# Patient Record
Sex: FEMALE | Race: WHITE | NOT HISPANIC OR LATINO
[De-identification: names, ages, dates, MRNs, and addresses within clinical notes are randomized per-mention and may not be internally consistent; named-entity substitution may affect disease eponyms.]

---

## 2022-10-07 PROBLEM — Z00.00 ENCOUNTER FOR PREVENTIVE HEALTH EXAMINATION: Status: ACTIVE | Noted: 2022-10-07

## 2022-10-26 ENCOUNTER — NON-APPOINTMENT (OUTPATIENT)
Age: 22
End: 2022-10-26

## 2022-11-22 ENCOUNTER — APPOINTMENT (OUTPATIENT)
Dept: NEUROSURGERY | Facility: CLINIC | Age: 22
End: 2022-11-22

## 2022-11-22 DIAGNOSIS — H93.A9 PULSATILE TINNITUS, UNSPECIFIED EAR: ICD-10-CM

## 2022-11-22 PROCEDURE — EDU01: CPT

## 2022-11-30 ENCOUNTER — TRANSCRIPTION ENCOUNTER (OUTPATIENT)
Age: 22
End: 2022-11-30

## 2022-12-05 NOTE — HISTORY OF PRESENT ILLNESS
[de-identified] : I conducted a remote educational consult with SANDRA KHALIL on 11/22/2022. I explained that I am only able to provide an educational consult and not render treatment as I am not licensed in the state in which SAN[place]HAL[place] is located. A written informed consent for the educational consult was obtained and is included in the EHR. SANDRA KHALIL is informed that there would be a $250 cost associated with the conduct of the remote educational consult. \par \par Ms. KHALIL is a pleasant 22 year old female who presents today with a chief complaint of bilateral (RIGHT>L) ear pulsatile tinnitus.  It first started in 8/2022 and has slightly improved with time.  It is described as a constant, whooshing sound that is in sync with her pulse, it is often masked by her non-pulsatile ringing tinnitus.  Her pulsatile tinnitus improves with allergy medication.  It also improves intermittently with right neck pressure.  \par \par She has been seen by ENT in the past and had a normal hearing test as per patient.  She is told that her tympanic membrane is "red and swollen" without cause.   \par \par She has headaches occasionally when she wakes up in the morning but reports they are not debilitating.  \par \par She denies any blurry vision or diplopia.  She saw an ophthalmologist last month who ruled out papilledema.

## 2022-12-05 NOTE — ASSESSMENT
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 We discussed the natural history of intracranial venous aneurysms and I explained to the patient that these aneurysms DO NOT cause intracranial hemorrhage or stroke. \par \par Based on her prior imaging and symptoms, she is a good candidate for embolization of venous aneurysm as a minimally invasive surgical treatment.  I recommend getting a dedicated MRV Head to confirm the venous stenosis.  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  I would also like for her to have another ENT opinion.  She states that she has been told she has persistent redness and swelling of her tympanic membranes and I'd like to make sure there is no other cause before making final treatment recommendations. \par \par Recommenations:\par MRV Head w/wo to confirm venous sinus stenosis\par ENT assessment to rule out any other cause of pulsatile tinnitus\par Follow Up with Me After the Above

## 2022-12-05 NOTE — REASON FOR VISIT
[Home] : at home, [unfilled] , at the time of the visit. [Medical Office: ([facility])___] : at the medical office located in  [Patient] : the patient [Self] : self [New Patient Visit] : a new patient visit [FreeTextEntry1] : Pulsatile Tinnitus